# Patient Record
Sex: MALE | Race: WHITE
[De-identification: names, ages, dates, MRNs, and addresses within clinical notes are randomized per-mention and may not be internally consistent; named-entity substitution may affect disease eponyms.]

---

## 2019-09-30 ENCOUNTER — HOSPITAL ENCOUNTER (EMERGENCY)
Dept: HOSPITAL 53 - M ED | Age: 39
Discharge: HOME | End: 2019-09-30
Payer: COMMERCIAL

## 2019-09-30 VITALS
WEIGHT: 179.9 LBS | HEIGHT: 66 IN | SYSTOLIC BLOOD PRESSURE: 138 MMHG | DIASTOLIC BLOOD PRESSURE: 84 MMHG | BODY MASS INDEX: 28.91 KG/M2
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
## 2019-09-30 DIAGNOSIS — S62.635A: Primary | ICD-10-CM
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
## 2019-09-30 NOTE — REP
LEFT FOURTH DIGIT:

 

Four views of the left fourth digit performed.  There is an avulsion fracture at

the dorsal base of the distal phalanx which is slightly displaced.  There is no

other evidence of acute fracture or dislocation.

 

 

Electronically Signed by

Maik Daniel MD 09/30/2019 04:36 P